# Patient Record
Sex: MALE | Race: WHITE | URBAN - METROPOLITAN AREA
[De-identification: names, ages, dates, MRNs, and addresses within clinical notes are randomized per-mention and may not be internally consistent; named-entity substitution may affect disease eponyms.]

---

## 2017-06-20 NOTE — PROGRESS NOTES
"  SUBJECTIVE:     CC: Juan Mc is an 31 year old male who presents for preventative health visit.     Healthy Habits:    Do you get at least three servings of calcium containing foods daily (dairy, green leafy vegetables, etc.)? {YES/NO, DAIRY INTAKE:941152::\"yes\"}    Amount of exercise or daily activities, outside of work: {AMOUNT EXERCISE:553575}    Problems taking medications regularly {Yes /No default:127122::\"No\"}    Medication side effects: {Yes /No default.:827216::\"No\"}    Have you had an eye exam in the past two years? {YESNOBLANK:613565}    Do you see a dentist twice per year? {YESNOBLANK:870145}    Do you have sleep apnea, excessive snoring or daytime drowsiness?{YESNOBLANK:458239}    {Outside tests to abstract? :686353}    {additional problems to add:238797}    Today's PHQ-2 Score: No flowsheet data found.  {PHQ-2 LOOK IN ASSESSMENTS :391466}  Abuse: Current or Past(Physical, Sexual or Emotional)- {YES/NO/NA:384915}  Do you feel safe in your environment - {YES/NO/NA:617379}    Social History   Substance Use Topics     Smoking status: Not on file     Smokeless tobacco: Not on file     Alcohol use Not on file     {ETOH AUDIT:958827}    Last PSA: No results found for: PSA    No results for input(s): CHOL, HDL, LDL, TRIG, CHOLHDLRATIO, NHDL in the last 65676 hours.    Reviewed orders with patient. Reviewed health maintenance and updated orders accordingly - {Yes/No:682536::\"Yes\"}    Reviewed and updated as needed this visit by clinical staff         Reviewed and updated as needed this visit by Provider        {HISTORY OPTIONS:110323}    ROS:  {MALE ROS-adult preventive care package:935758::\"C: NEGATIVE for fever, chills, change in weight\",\"I: NEGATIVE for worrisome rashes, moles or lesions\",\"E: NEGATIVE for vision changes or irritation\",\"ENT: NEGATIVE for ear, mouth and throat problems\",\"R: NEGATIVE for significant cough or SOB\",\"CV: NEGATIVE for chest pain, palpitations or peripheral edema\",\"GI: " "NEGATIVE for nausea, abdominal pain, heartburn, or change in bowel habits\",\" male: negative for dysuria, hematuria, decreased urinary stream, erectile dysfunction, urethral discharge\",\"M: NEGATIVE for significant arthralgias or myalgia\",\"N: NEGATIVE for weakness, dizziness or paresthesias\",\"P: NEGATIVE for changes in mood or affect\"}    {CHRONICPROBDATA:412685}  OBJECTIVE:     There were no vitals taken for this visit.  EXAM:  {Exam Choices:596108}    ASSESSMENT/PLAN:     {Diag Picklist:592442}    COUNSELING:  {MALE COUNSELING MESSAGES:123393::\"Reviewed preventive health counseling, as reflected in patient instructions\"}    {Blood Pressure - Adult Preventive:412768}     has no tobacco history on file.  {Tobacco Cessation needed for ACO -- Delete if patient is a non-smoker:188000}  There is no height or weight on file to calculate BMI.   {Weight Management Plan needed for ACO:581626}    Counseling Resources:  ATP IV Guidelines  Pooled Cohorts Equation Calculator  FRAX Risk Assessment  ICSI Preventive Guidelines  Dietary Guidelines for Americans, 2010  USDA's MyPlate  ASA Prophylaxis  Lung CA Screening    Santosh Wan MD  [facility]  "

## 2017-06-20 NOTE — PATIENT INSTRUCTIONS
Preventive Health Recommendations  Male Ages 26 - 39    Yearly exam:             See your health care provider every year in order to  o   Review health changes.   o   Discuss preventive care.    o   Review your medicines if your doctor has prescribed any.    You should be tested each year for STDs (sexually transmitted diseases), if you re at risk.     After age 35, talk to your provider about cholesterol testing. If you are at risk for heart disease, have your cholesterol tested at least every 5 years.     If you are at risk for diabetes, you should have a diabetes test (fasting glucose).  Shots: Get a flu shot each year. Get a tetanus shot every 10 years.     Nutrition:    Eat at least 5 servings of fruits and vegetables daily.     Eat whole-grain bread, whole-wheat pasta and brown rice instead of white grains and rice.     Talk to your provider about Calcium and Vitamin D.     Lifestyle    Exercise for at least 150 minutes a week (30 minutes a day, 5 days a week). This will help you control your weight and prevent disease.     Limit alcohol to one drink per day.     No smoking.     Wear sunscreen to prevent skin cancer.     See your dentist every six months for an exam and cleaning.   These are new changes to your current plan of care based on today's visit:    Medications stopped    Medications to be started    Change dose of this medication    New treatments        Follow up appointments:    1.  FOLLOW UP WITH YOUR PRIMARY CARE PROVIDER: 1 year(s) for clinic visit with general physical

## 2017-06-22 ENCOUNTER — OFFICE VISIT (OUTPATIENT)
Dept: FAMILY MEDICINE | Facility: CLINIC | Age: 32
End: 2017-06-22
Payer: COMMERCIAL

## 2017-06-22 VITALS
WEIGHT: 237 LBS | DIASTOLIC BLOOD PRESSURE: 68 MMHG | SYSTOLIC BLOOD PRESSURE: 110 MMHG | TEMPERATURE: 97.3 F | HEART RATE: 84 BPM

## 2017-06-22 DIAGNOSIS — E78.5 HYPERLIPIDEMIA LDL GOAL <160: ICD-10-CM

## 2017-06-22 DIAGNOSIS — Z00.00 ROUTINE GENERAL MEDICAL EXAMINATION AT A HEALTH CARE FACILITY: Primary | ICD-10-CM

## 2017-06-22 DIAGNOSIS — Z23 NEED FOR TDAP VACCINATION: ICD-10-CM

## 2017-06-22 LAB
ANION GAP SERPL CALCULATED.3IONS-SCNC: 9 MMOL/L (ref 3–14)
BUN SERPL-MCNC: 17 MG/DL (ref 7–30)
CALCIUM SERPL-MCNC: 9.6 MG/DL (ref 8.5–10.1)
CHLORIDE SERPL-SCNC: 102 MMOL/L (ref 94–109)
CHOLEST SERPL-MCNC: 242 MG/DL
CO2 SERPL-SCNC: 28 MMOL/L (ref 20–32)
CREAT SERPL-MCNC: 1.1 MG/DL (ref 0.66–1.25)
ERYTHROCYTE [DISTWIDTH] IN BLOOD BY AUTOMATED COUNT: 13 % (ref 10–15)
GFR SERPL CREATININE-BSD FRML MDRD: 78 ML/MIN/1.7M2
GLUCOSE SERPL-MCNC: 86 MG/DL (ref 70–99)
HCT VFR BLD AUTO: 45.5 % (ref 40–53)
HDLC SERPL-MCNC: 43 MG/DL
HGB BLD-MCNC: 15.7 G/DL (ref 13.3–17.7)
LDLC SERPL CALC-MCNC: 138 MG/DL
MCH RBC QN AUTO: 29.2 PG (ref 26.5–33)
MCHC RBC AUTO-ENTMCNC: 34.5 G/DL (ref 31.5–36.5)
MCV RBC AUTO: 85 FL (ref 78–100)
NONHDLC SERPL-MCNC: 199 MG/DL
PLATELET # BLD AUTO: 265 10E9/L (ref 150–450)
POTASSIUM SERPL-SCNC: 4.3 MMOL/L (ref 3.4–5.3)
RBC # BLD AUTO: 5.38 10E12/L (ref 4.4–5.9)
SODIUM SERPL-SCNC: 139 MMOL/L (ref 133–144)
TRIGL SERPL-MCNC: 304 MG/DL
WBC # BLD AUTO: 5.6 10E9/L (ref 4–11)

## 2017-06-22 PROCEDURE — 99385 PREV VISIT NEW AGE 18-39: CPT | Mod: 25 | Performed by: FAMILY MEDICINE

## 2017-06-22 PROCEDURE — 90715 TDAP VACCINE 7 YRS/> IM: CPT | Performed by: FAMILY MEDICINE

## 2017-06-22 PROCEDURE — 80048 BASIC METABOLIC PNL TOTAL CA: CPT | Performed by: FAMILY MEDICINE

## 2017-06-22 PROCEDURE — 85027 COMPLETE CBC AUTOMATED: CPT | Performed by: FAMILY MEDICINE

## 2017-06-22 PROCEDURE — 80061 LIPID PANEL: CPT | Performed by: FAMILY MEDICINE

## 2017-06-22 PROCEDURE — 90471 IMMUNIZATION ADMIN: CPT | Performed by: FAMILY MEDICINE

## 2017-06-22 PROCEDURE — 36415 COLL VENOUS BLD VENIPUNCTURE: CPT | Performed by: FAMILY MEDICINE

## 2017-06-22 ASSESSMENT — PAIN SCALES - GENERAL: PAINLEVEL: NO PAIN (0)

## 2017-06-22 NOTE — MR AVS SNAPSHOT
After Visit Summary   6/22/2017    Juan Mc    MRN: [id number]           Patient Information     Date Of Birth          1985        Visit Information        Provider Department      6/22/2017 8:40 AM Santosh Wan MD [facility]        Today's Diagnoses     Routine general medical examination at a health care facility    -  1    Hyperlipidemia LDL goal <160        Need for Tdap vaccination          Care Instructions      Preventive Health Recommendations  Male Ages 26 - 39    Yearly exam:             See your health care provider every year in order to  o   Review health changes.   o   Discuss preventive care.    o   Review your medicines if your doctor has prescribed any.    You should be tested each year for STDs (sexually transmitted diseases), if you re at risk.     After age 35, talk to your provider about cholesterol testing. If you are at risk for heart disease, have your cholesterol tested at least every 5 years.     If you are at risk for diabetes, you should have a diabetes test (fasting glucose).  Shots: Get a flu shot each year. Get a tetanus shot every 10 years.     Nutrition:    Eat at least 5 servings of fruits and vegetables daily.     Eat whole-grain bread, whole-wheat pasta and brown rice instead of white grains and rice.     Talk to your provider about Calcium and Vitamin D.     Lifestyle    Exercise for at least 150 minutes a week (30 minutes a day, 5 days a week). This will help you control your weight and prevent disease.     Limit alcohol to one drink per day.     No smoking.     Wear sunscreen to prevent skin cancer.     See your dentist every six months for an exam and cleaning.   These are new changes to your current plan of care based on today's visit:    Medications stopped    Medications to be started    Change dose of this medication    New treatments        Follow up appointments:    1.  FOLLOW UP WITH YOUR PRIMARY CARE PROVIDER: 1 year(s)  "for clinic visit with general physical                    Follow-ups after your visit        Follow-up notes from your care team     Return in about 1 year (around 2018) for Physical Exam.      Who to contact     If you have questions or need follow up information about today's clinic visit or your schedule please contact [facility] directly at [phone].  Normal or non-critical lab and imaging results will be communicated to you by MyChart, letter or phone within 4 business days after the clinic has received the results. If you do not hear from us within 7 days, please contact the clinic through [employer] or phone. If you have a critical or abnormal lab result, we will notify you by phone as soon as possible.  Submit refill requests through [employer] or call your pharmacy and they will forward the refill request to us. Please allow 3 business days for your refill to be completed.          Additional Information About Your Visit        [employer] Information     [employer] lets you send messages to your doctor, view your test results, renew your prescriptions, schedule appointments and more. To sign up, go to [email or web address] . Click on \"Log in\" on the left side of the screen, which will take you to the Welcome page. Then click on \"Sign up Now\" on the right side of the page.     You will be asked to enter the access code listed below, as well as some personal information. Please follow the directions to create your username and password.     Your access code is: [email or web address]  Expires: 2017  9:13 AM     Your access code will  in 90 days. If you need help or a new code, please call your [place] clinic or [phone].        Care EveryWhere ID     This is your Care EveryWhere ID. This could be used by other organizations to access your [place] medical records  [id number]        Your Vitals Were     Pulse Temperature                84 97.3  F (36.3  C) (Temporal)           Blood " Pressure from Last 3 Encounters:   06/22/17 110/68    Weight from Last 3 Encounters:   06/22/17 237 lb (107.5 kg)              We Performed the Following     Basic metabolic panel     CBC with platelets     Lipid panel reflex to direct LDL     TDAP VACCINE (ADACEL)        Primary Care Provider    None Specified       No primary provider on file.        Equal Access to Services     [facility] HospitalGYPSY : Hadii ernst lange hadyazmino Soedilbertoali, waaxda luqadaha, qaybta kaalmada roe, vinod garcia . So [facility] [phone].    ATENCIÓN: Si habla español, tiene a guteirrez disposición servicios gratuitos de asistencia lingüística. Llame al [phone].    We comply with applicable federal civil rights laws and [place] laws. We do not discriminate on the basis of race, color, national origin, age, disability sex, sexual orientation or gender identity.            Thank you!     Thank you for choosing [facility]  for your care. Our goal is always to provide you with excellent care. Hearing back from our patients is one way we can continue to improve our services. Please take a few minutes to complete the written survey that you may receive in the mail after your visit with us. Thank you!             Your Updated Medication List - Protect others around you: Learn how to safely use, store and throw away your medicines at www.disposemymeds.org.      Notice  As of 6/22/2017  9:13 AM    You have not been prescribed any medications.

## 2017-06-22 NOTE — PROGRESS NOTES
SUBJECTIVE:     CC: Juan Mc is an 31 year old male who presents for preventative health visit.     Physical   Annual:     Getting at least 3 servings of Calcium per day::  Yes    Bi-annual eye exam::  Yes    Dental care twice a year::  NO    Sleep apnea or symptoms of sleep apnea::  None    Diet::  Regular (no restrictions)    Frequency of exercise::  1 day/week    Duration of exercise::  15-30 minutes    Taking medications regularly::  Not Applicable    Medication side effects::  Not applicable    Additional concerns today::  YES          Hyperlipidemia Follow-Up--history of having hyperlipidemia during biometric screening at work over a number of years.      Rate your low fat/cholesterol diet?: good    Taking statin?  No    Other lipid medications/supplements?:  none       Today's PHQ-2 Score:   PHQ-2 ( 1999 Pfizer) 6/22/2017   Q1: Little interest or pleasure in doing things 0   Q2: Feeling down, depressed or hopeless 0   PHQ-2 Score 0   Q1: Little interest or pleasure in doing things Not at all   Q2: Feeling down, depressed or hopeless Not at all   PHQ-2 Score 0       Abuse: Current or Past(Physical, Sexual or Emotional)- No  Do you feel safe in your environment - Yes    Social History   Substance Use Topics     Smoking status: Light Tobacco Smoker     Smokeless tobacco: Not on file     Alcohol use No     The patient does not drink >3 drinks per day nor >7 drinks per week.    Last PSA: No results found for: PSA    No results for input(s): CHOL, HDL, LDL, TRIG, CHOLHDLRATIO, NHDL in the last 00755 hours.    Reviewed orders with patient. Reviewed health maintenance and updated orders accordingly - Yes    Reviewed and updated as needed this visit by clinical staff  Tobacco  Allergies  Med Hx  Surg Hx  Fam Hx  Soc Hx        Reviewed and updated as needed this visit by Provider        Past Medical History:   Diagnosis Date     Hyperlipidemia LDL goal <160      Seasonal allergies       Past Surgical History:    Procedure Laterality Date     SURGICAL HISTORY OF -  Right 2011    Right knee ACL repair       ROS:  C: NEGATIVE for fever, chills, change in weight  I: NEGATIVE for worrisome rashes, moles or lesions  E: NEGATIVE for vision changes or irritation  ENT: NEGATIVE for ear, mouth and throat problems  R: NEGATIVE for significant cough or SOB  B: NEGATIVE for masses, tenderness or discharge  CV: NEGATIVE for chest pain, palpitations or peripheral edema  GI: NEGATIVE for nausea, abdominal pain, heartburn, or change in bowel habits   male: negative for dysuria, hematuria, decreased urinary stream, erectile dysfunction, urethral discharge  M: NEGATIVE for significant arthralgias or myalgia  N: NEGATIVE for weakness, dizziness or paresthesias  E: NEGATIVE for temperature intolerance, skin/hair changes  H: NEGATIVE for bleeding problems  P: NEGATIVE for changes in mood or affect    Problem list, Medication list, Allergies, and Medical/Social/Surgical histories reviewed in [facility] and updated as appropriate.  OBJECTIVE:     /68  Pulse 84  Temp 97.3  F (36.3  C) (Temporal)  Wt 237 lb (107.5 kg)  EXAM:  GENERAL: healthy, alert and no distress  EYES: Eyes grossly normal to inspection, PERRL and conjunctivae and sclerae normal  HENT: ear canals and TM's normal, nose and mouth without ulcers or lesions  NECK: no adenopathy, no asymmetry, masses, or scars and thyroid normal to palpation  RESP: lungs clear to auscultation - no rales, rhonchi or wheezes  BREAST: normal without masses, tenderness or nipple discharge and no palpable axillary masses or adenopathy  CV: regular rate and rhythm, normal S1 S2, no S3 or S4, no murmur, click or rub, no peripheral edema and peripheral pulses strong  ABDOMEN: soft, nontender, no hepatosplenomegaly, no masses and bowel sounds normal   (male): normal male genitalia without lesions or urethral discharge, no hernia  RECTAL: deferred  MS: no gross musculoskeletal defects noted, no  edema  MS: Prior right knee surgery  SKIN: no suspicious lesions or rashes  NEURO: Normal strength and tone, mentation intact and speech normal  PSYCH: mentation appears normal, affect normal/bright  LYMPH: no cervical, supraclavicular, axillary, or inguinal adenopathy    ASSESSMENT/PLAN:     1. Routine general medical examination at a health care facility  General physical completed -- benign  - CBC with platelets  - Lipid panel reflex to direct LDL  - Basic metabolic panel    2. Hyperlipidemia LDL goal <160  Checking profile  - Lipid panel reflex to direct LDL    3. Need for Tdap vaccination  Last tetanus 8+ years ago but is uncertain. Updated today  - TDAP VACCINE (ADACEL)    COUNSELING:   Reviewed preventive health counseling, as reflected in patient instructions  Special attention given to:        Regular exercise       Healthy diet/nutrition       Immunizations    Vaccinated for: TDAP             reports that he has been smoking.  He does not have any smokeless tobacco history on file.  Tobacco Cessation Action Plan: Self help information given to patient  There is no height or weight on file to calculate BMI.   Weight management plan: Discussed healthy diet and exercise guidelines and patient will follow up in 12 months in clinic to re-evaluate.    Counseling Resources:  ATP IV Guidelines  Pooled Cohorts Equation Calculator  FRAX Risk Assessment  ICSI Preventive Guidelines  Dietary Guidelines for Americans, 2010  USDA's MyPlate  ASA Prophylaxis  Lung CA Screening    The patient understood the rational for the diagnosis and treatment plan. All questions were answered to best of my ability and the patient's satisfaction.    Follow up in one year or as needed    Santosh Wan MD  [facility]  Answers for HPI/ROS submitted by the patient on 6/22/2017   PHQ-2 Score: 0

## 2017-06-22 NOTE — NURSING NOTE
Screening Questionnaire for Adult Immunization    Are you sick today?   No   Do you have allergies to medications, food, a vaccine component or latex?   No   Have you ever had a serious reaction after receiving a vaccination?   No   Do you have a long-term health problem with heart disease, lung disease, asthma, kidney disease, metabolic disease (e.g. diabetes), anemia, or other blood disorder?   No   Do you have cancer, leukemia, HIV/AIDS, or any other immune system problem?   No   In the past 3 months, have you taken medications that affect  your immune system, such as prednisone, other steroids, or anticancer drugs; drugs for the treatment of rheumatoid arthritis, Crohn s disease, or psoriasis; or have you had radiation treatments?   No   Have you had a seizure, or a brain or other nervous system problem?   No   During the past year, have you received a transfusion of blood or blood     products, or been given immune (gamma) globulin or antiviral drug?   No   For women: Are you pregnant or is there a chance you could become        pregnant during the next month?   No   Have you received any vaccinations in the past 4 weeks?   No     Immunization questionnaire answers were all negative.      MNVFC doesn't apply on this patient    Per orders of Dr. Wan, injection of TDAP given by Wagner Millan. Patient instructed to remain in clinic for 20 minutes afterwards, and to report any adverse reaction to me immediately.       Screening performed by Wagner Millan on 6/22/2017 at 9:26 AM.

## 2017-06-22 NOTE — NURSING NOTE
Chief Complaint   Patient presents with     Physical     Panel Management     PAMELA, MyChart, Tdap, LAZARUS       Initial /68  Pulse 84  Temp 97.3  F (36.3  C) (Temporal)  Wt 237 lb (107.5 kg) There is no height or weight on file to calculate BMI.  Medication Reconciliation: complete    Vi Burton MA

## 2017-06-22 NOTE — LETTER
June 22, 2017      Juan Mc  [address] [address]              Dear Juan,    Enclosed is a copy of the laboratory values as well as information concerning some dietary suggestions for lowering the triglycerides and total cholesterol. Also included is a brief explanation of the lipid panel and the tests included.    Please call with any questions or concerns and thank you for your confidence.            Sincerely,      Santosh Wan MD

## 2019-05-10 ENCOUNTER — TELEPHONE (OUTPATIENT)
Dept: FAMILY MEDICINE | Facility: CLINIC | Age: 34
End: 2019-05-10

## 2019-05-10 NOTE — TELEPHONE ENCOUNTER
Summary:    Patient is due/failing the following:   PHYSICAL    Action needed:   Patient needs office visit for Physical and fasting lab.    Type of outreach:    Phone, left message for patient to call back.     Questions for provider review:    None                                                                                                                                    Aleena Vargas     Chart routed to Care Team .    Panel Management Review      Patient has the following on his problem list: None      Composite cancer screening  Chart review shows that this patient is due/due soon for the following None

## 2019-05-30 NOTE — TELEPHONE ENCOUNTER
Spoke to patient and he will call back to schedule closer to [employer] since he no longer works in [employer].     Aleena Vargas

## 2020-03-11 ENCOUNTER — HEALTH MAINTENANCE LETTER (OUTPATIENT)
Age: 35
End: 2020-03-11

## 2020-12-27 ENCOUNTER — HEALTH MAINTENANCE LETTER (OUTPATIENT)
Age: 35
End: 2020-12-27

## 2021-04-24 ENCOUNTER — HEALTH MAINTENANCE LETTER (OUTPATIENT)
Age: 36
End: 2021-04-24

## 2021-10-09 ENCOUNTER — HEALTH MAINTENANCE LETTER (OUTPATIENT)
Age: 36
End: 2021-10-09

## 2022-05-21 ENCOUNTER — HEALTH MAINTENANCE LETTER (OUTPATIENT)
Age: 37
End: 2022-05-21

## 2022-09-17 ENCOUNTER — HEALTH MAINTENANCE LETTER (OUTPATIENT)
Age: 37
End: 2022-09-17

## 2023-06-03 ENCOUNTER — HEALTH MAINTENANCE LETTER (OUTPATIENT)
Age: 38
End: 2023-06-03